# Patient Record
Sex: FEMALE | Race: WHITE | Employment: UNEMPLOYED | URBAN - METROPOLITAN AREA
[De-identification: names, ages, dates, MRNs, and addresses within clinical notes are randomized per-mention and may not be internally consistent; named-entity substitution may affect disease eponyms.]

---

## 2015-12-04 LAB — ANTIBODY: NORMAL

## 2018-04-30 ENCOUNTER — OFFICE VISIT (OUTPATIENT)
Dept: ORTHOPEDIC SURGERY | Age: 53
End: 2018-04-30

## 2018-04-30 DIAGNOSIS — M25.511 RIGHT SHOULDER PAIN, UNSPECIFIED CHRONICITY: Primary | ICD-10-CM

## 2018-04-30 DIAGNOSIS — M75.81 TENDINITIS OF RIGHT ROTATOR CUFF: ICD-10-CM

## 2018-04-30 PROCEDURE — 99203 OFFICE O/P NEW LOW 30 MIN: CPT | Performed by: PHYSICIAN ASSISTANT

## 2018-04-30 RX ORDER — METHYLPREDNISOLONE 4 MG/1
TABLET ORAL
Qty: 1 KIT | Refills: 0 | Status: SHIPPED | OUTPATIENT
Start: 2018-04-30 | End: 2018-05-06

## 2018-04-30 RX ORDER — METHYLPREDNISOLONE 4 MG/1
TABLET ORAL
Qty: 1 KIT | Refills: 0 | Status: SHIPPED | OUTPATIENT
Start: 2018-04-30 | End: 2018-04-30 | Stop reason: CLARIF

## 2018-05-08 ENCOUNTER — HOSPITAL ENCOUNTER (OUTPATIENT)
Dept: PHYSICAL THERAPY | Age: 53
Discharge: HOME | End: 2018-05-31
Admitting: PHYSICIAN ASSISTANT

## 2018-05-17 ENCOUNTER — HOSPITAL ENCOUNTER (OUTPATIENT)
Dept: PHYSICAL THERAPY | Facility: MEDICAL CENTER | Age: 53
Discharge: HOME | End: 2018-05-18
Admitting: PHYSICIAN ASSISTANT

## 2018-05-17 NOTE — FLOWSHEET NOTE
passive flexion      Self passive external rotation      Table slides            Pendulums       Wall walk      Wall ladder      Upper extremity ranger      pulleys      finisher            Self wrist overpressure      Self elbow motion and overpressure      Gripping            ER isometrics      Flexion isometrics      IR isometrics            PRE's      Supine      Supine short lever flexion      Supine chest press      Supine serratus punch 1# x30  Added 5/17   Sidelying flexion      sidelying ER 1#  x30     Prone extension 1#  x30     Prone row 1#  x30     Prone HAB 1#  x30     Prone Flexion      Prone Scaption      Standing shrugs      Wall angels (to 90 deg abd) x30  Added 5/17   Theraband      Scapular retraction- green  x30     shrugs      ER- green  x20  Pain at end   Extension- green  x30     IR- green  x30     No money- green x30  Added 5/17; pain noted   D2 flexion Add NV       - HEP provided and a copy can be found in the media file. Therapeutic Exercise and NMR EXR  [x] (87859) Provided verbal/tactile cueing for activities related to strengthening, flexibility, endurance, ROM  for improvements in scapular, scapulothoracic and UE control with self care, reaching, carrying, lifting, house/yardwork, driving/computer work.    [] (49251) Provided verbal/tactile cueing for activities related to improving balance, coordination, kinesthetic sense, posture, motor skill, proprioception  to assist with  scapular, scapulothoracic and UE control with self care, reaching, carrying, lifting, house/yardwork, driving/computer work. Therapeutic Activities:    [] (79236 or 36978) Provided verbal/tactile cueing for activities related to improving balance, coordination, kinesthetic sense, posture, motor skill, proprioception and motor activation to allow for proper function of scapular, scapulothoracic and UE control with self care, carrying, lifting, driving/computer work.      Home Exercise Program:    [x] (93029) active ROM to full, symmetrical and painfree to allow for proper joint functioning as indicated by patients Functional Deficits. - Patient will demonstrate an increase in Strength to symmetrical and painfree to allow for proper functional mobility as indicated by patient's limitations in activity. - Patient will return to desired, higher level upper extremity functional activities without increased symptoms or restriction. Progression Towards Functional goals:  [] Patient is progressing as expected towards functional goals listed. [] Progression is slowed due to complexities listed. [] Progression has been slowed due to co-morbidities.   [x] Plan just implemented, too soon to assess goals progression  [] Other:     ASSESSMENT:  See eval    Treatment/Activity Tolerance:  [x] Patient tolerated treatment well [] Patient limited by fatique  [x] Patient limited by pain  [] Patient limited by other medical complications  [] Other:     Prognosis: [x] Good [] Fair  [] Poor    Patient Requires Follow-up: [x] Yes  [] No    PLAN: See eval  [] Continue per plan of care [] Alter current plan (see comments)  [x] Plan of care initiated [] Hold pending MD visit [] Discharge    Plan for Next Session:  Progress manual therapy, PROM, AAROM, AROM and PRE's as tolerated per standard of care toward independence in rotator cuff and scapular conditioning program.    Electronically signed by: Doug Millan PT, DPT, ATC

## 2018-06-01 ENCOUNTER — HOSPITAL ENCOUNTER (OUTPATIENT)
Dept: PHYSICAL THERAPY | Age: 53
Discharge: HOME | End: 2018-06-30
Attending: PHYSICIAN ASSISTANT | Admitting: PHYSICIAN ASSISTANT

## 2018-06-07 ENCOUNTER — HOSPITAL ENCOUNTER (OUTPATIENT)
Dept: PHYSICAL THERAPY | Facility: MEDICAL CENTER | Age: 53
Discharge: HOME | End: 2018-06-08
Admitting: PHYSICIAN ASSISTANT

## 2018-07-01 ENCOUNTER — HOSPITAL ENCOUNTER (OUTPATIENT)
Dept: PHYSICAL THERAPY | Age: 53
Discharge: HOME | End: 2018-07-01
Attending: PHYSICIAN ASSISTANT | Admitting: PHYSICIAN ASSISTANT

## 2018-07-11 ENCOUNTER — OFFICE VISIT (OUTPATIENT)
Dept: INTERNAL MEDICINE CLINIC | Age: 53
End: 2018-07-11

## 2018-07-11 VITALS
SYSTOLIC BLOOD PRESSURE: 108 MMHG | HEIGHT: 64 IN | OXYGEN SATURATION: 98 % | BODY MASS INDEX: 21.17 KG/M2 | WEIGHT: 124 LBS | HEART RATE: 78 BPM | DIASTOLIC BLOOD PRESSURE: 64 MMHG

## 2018-07-11 DIAGNOSIS — Z13.1 SCREENING FOR DIABETES MELLITUS: ICD-10-CM

## 2018-07-11 DIAGNOSIS — Z13.220 LIPID SCREENING: ICD-10-CM

## 2018-07-11 DIAGNOSIS — Z00.00 ANNUAL PHYSICAL EXAM: Primary | ICD-10-CM

## 2018-07-11 LAB — Lab: NORMAL

## 2018-07-11 PROCEDURE — 99386 PREV VISIT NEW AGE 40-64: CPT | Performed by: INTERNAL MEDICINE

## 2018-07-11 ASSESSMENT — ENCOUNTER SYMPTOMS: HEARTBURN: 1

## 2019-05-16 ENCOUNTER — TELEPHONE (OUTPATIENT)
Dept: INTERNAL MEDICINE CLINIC | Age: 54
End: 2019-05-16

## 2019-05-17 ENCOUNTER — TELEPHONE (OUTPATIENT)
Dept: INTERNAL MEDICINE CLINIC | Age: 54
End: 2019-05-17

## 2019-05-20 ENCOUNTER — TELEPHONE (OUTPATIENT)
Dept: INTERNAL MEDICINE CLINIC | Age: 54
End: 2019-05-20

## 2019-05-20 NOTE — TELEPHONE ENCOUNTER
Last appointment: 7/11/2018  Next appointment: 7/19/2019    Please advise I do not see on current or HX med list. Pt states that Dr. Eriberto Jack prescribed years ago. States her allergies are very bad this year and nothing is helping.

## 2019-05-21 NOTE — TELEPHONE ENCOUNTER
What are her allergy symptoms? She's not using flonase or other similar medication, which is typically next step after antihistamine pill. Has she every tried.  Trying to determine which likely to work best.

## 2019-05-21 NOTE — TELEPHONE ENCOUNTER
Pt is having a lot of sneezing and stuffy nose. Slight cough. Pt states she tried flonase in the past and it did work . Per Dr. Pamela Dorantes. Start flonase along with zyrtec and if sxs still not improved call the office bk .

## 2019-10-11 ENCOUNTER — TELEPHONE (OUTPATIENT)
Dept: INTERNAL MEDICINE CLINIC | Age: 54
End: 2019-10-11

## 2019-10-11 ENCOUNTER — OFFICE VISIT (OUTPATIENT)
Dept: INTERNAL MEDICINE CLINIC | Age: 54
End: 2019-10-11
Payer: COMMERCIAL

## 2019-10-11 VITALS
OXYGEN SATURATION: 98 % | WEIGHT: 121 LBS | BODY MASS INDEX: 20.66 KG/M2 | HEIGHT: 64 IN | SYSTOLIC BLOOD PRESSURE: 98 MMHG | HEART RATE: 52 BPM | DIASTOLIC BLOOD PRESSURE: 54 MMHG

## 2019-10-11 DIAGNOSIS — Z13.1 SCREENING FOR DIABETES MELLITUS: ICD-10-CM

## 2019-10-11 DIAGNOSIS — Z00.00 ANNUAL PHYSICAL EXAM: Primary | ICD-10-CM

## 2019-10-11 PROCEDURE — 99396 PREV VISIT EST AGE 40-64: CPT | Performed by: INTERNAL MEDICINE

## 2019-10-11 PROCEDURE — 90471 IMMUNIZATION ADMIN: CPT | Performed by: INTERNAL MEDICINE

## 2019-10-11 PROCEDURE — 90715 TDAP VACCINE 7 YRS/> IM: CPT | Performed by: INTERNAL MEDICINE

## 2019-10-11 SDOH — HEALTH STABILITY: MENTAL HEALTH: HOW MANY STANDARD DRINKS CONTAINING ALCOHOL DO YOU HAVE ON A TYPICAL DAY?: 1 OR 2

## 2019-10-11 ASSESSMENT — PATIENT HEALTH QUESTIONNAIRE - PHQ9
1. LITTLE INTEREST OR PLEASURE IN DOING THINGS: 0
SUM OF ALL RESPONSES TO PHQ9 QUESTIONS 1 & 2: 0
2. FEELING DOWN, DEPRESSED OR HOPELESS: 0
SUM OF ALL RESPONSES TO PHQ QUESTIONS 1-9: 0
SUM OF ALL RESPONSES TO PHQ QUESTIONS 1-9: 0

## 2019-11-20 ENCOUNTER — OFFICE VISIT (OUTPATIENT)
Dept: INTERNAL MEDICINE CLINIC | Age: 54
End: 2019-11-20
Payer: COMMERCIAL

## 2019-11-20 VITALS
WEIGHT: 122 LBS | SYSTOLIC BLOOD PRESSURE: 112 MMHG | HEART RATE: 76 BPM | DIASTOLIC BLOOD PRESSURE: 58 MMHG | OXYGEN SATURATION: 98 % | BODY MASS INDEX: 20.78 KG/M2

## 2019-11-20 DIAGNOSIS — J06.9 URI WITH COUGH AND CONGESTION: Primary | ICD-10-CM

## 2019-11-20 PROCEDURE — 99213 OFFICE O/P EST LOW 20 MIN: CPT | Performed by: INTERNAL MEDICINE

## 2019-11-20 RX ORDER — BENZONATATE 100 MG/1
CAPSULE ORAL
Qty: 30 CAPSULE | Refills: 0 | Status: SHIPPED | OUTPATIENT
Start: 2019-11-20 | End: 2019-11-27

## 2019-11-20 RX ORDER — DOXYCYCLINE HYCLATE 100 MG
100 TABLET ORAL 2 TIMES DAILY
Qty: 14 TABLET | Refills: 0 | Status: SHIPPED | OUTPATIENT
Start: 2019-11-20 | End: 2019-11-27

## 2019-11-27 ENCOUNTER — TELEPHONE (OUTPATIENT)
Dept: INTERNAL MEDICINE CLINIC | Age: 54
End: 2019-11-27

## 2020-11-17 ENCOUNTER — OFFICE VISIT (OUTPATIENT)
Dept: INTERNAL MEDICINE CLINIC | Age: 55
End: 2020-11-17
Payer: COMMERCIAL

## 2020-11-17 VITALS
RESPIRATION RATE: 16 BRPM | SYSTOLIC BLOOD PRESSURE: 100 MMHG | TEMPERATURE: 97.3 F | HEIGHT: 64 IN | BODY MASS INDEX: 21 KG/M2 | OXYGEN SATURATION: 98 % | WEIGHT: 123 LBS | HEART RATE: 56 BPM | DIASTOLIC BLOOD PRESSURE: 62 MMHG

## 2020-11-17 PROBLEM — M75.81 TENDINITIS OF RIGHT ROTATOR CUFF: Status: RESOLVED | Noted: 2018-04-30 | Resolved: 2020-11-17

## 2020-11-17 PROCEDURE — 90471 IMMUNIZATION ADMIN: CPT | Performed by: INTERNAL MEDICINE

## 2020-11-17 PROCEDURE — 90686 IIV4 VACC NO PRSV 0.5 ML IM: CPT | Performed by: INTERNAL MEDICINE

## 2020-11-17 PROCEDURE — 99396 PREV VISIT EST AGE 40-64: CPT | Performed by: INTERNAL MEDICINE

## 2020-11-17 RX ORDER — CONJUGATED ESTROGENS 0.62 MG/G
CREAM VAGINAL
COMMUNITY
Start: 2020-09-01 | End: 2022-09-21

## 2020-11-17 SDOH — HEALTH STABILITY: MENTAL HEALTH: HOW OFTEN DO YOU HAVE A DRINK CONTAINING ALCOHOL?: 4 OR MORE TIMES A WEEK

## 2020-11-17 ASSESSMENT — PATIENT HEALTH QUESTIONNAIRE - PHQ9
SUM OF ALL RESPONSES TO PHQ QUESTIONS 1-9: 0
SUM OF ALL RESPONSES TO PHQ QUESTIONS 1-9: 0
1. LITTLE INTEREST OR PLEASURE IN DOING THINGS: 0
2. FEELING DOWN, DEPRESSED OR HOPELESS: 0
SUM OF ALL RESPONSES TO PHQ QUESTIONS 1-9: 0
SUM OF ALL RESPONSES TO PHQ9 QUESTIONS 1 & 2: 0

## 2020-11-17 ASSESSMENT — ENCOUNTER SYMPTOMS
RESPIRATORY NEGATIVE: 1
GASTROINTESTINAL NEGATIVE: 1

## 2020-11-17 NOTE — PATIENT INSTRUCTIONS
Recommendations for optimal health:  Be sure you are exercising at least 30 minutes  or 10,000 steps daily. Ideally you should try to get a mix of cardio and strength exercises. Work on [employer] For more detailed information, visit Nutrition Source web site- knowledge for healthy eating from [address] [place] [address]. [facility]      If your are using supplements, look for \"USP verified\" on the label. Helps to assure they are good quality. Vitamin D 800 units daily. Calcium intake - try for 800-1200 mg of calcium in combination of diet and supplements. You can read on this in much more detail on nutritionJielan Information Companye. org    8 Nutrition Tips for Eating Right:  1. Choose good carbs, not no carbs. Whole grains are your best bet. 2. Pay attention to the protein package. Fish, poultry, nuts, and beans are the best choices. 3. Choose foods with healthy fats, limit foods high in saturated fat, and avoid foods with trans fat. Plant oils, nuts, and fish are the healthiest sources. 4. Choose a fiber-filled diet, rich in whole grains, vegetables, and fruits. 5. Eat more vegetables and fruits. Go for color and variety--dark green, yellow, orange, and red. 6. Calcium is important. But milk isnt the only, or even best, source. 7. Water is best to quench your thirst. Skip the sugary drinks, and go easy on the milk and juice. 8. Eating less salt is good for everyones health. Choose more fresh foods and fewer processed foods. Aim for 2-3 cups of vegetables daily and 1 1/2-2 cups of fruits daily.

## 2021-03-16 ENCOUNTER — TELEPHONE (OUTPATIENT)
Dept: INTERNAL MEDICINE CLINIC | Age: 56
End: 2021-03-16

## 2021-03-17 ENCOUNTER — TELEPHONE (OUTPATIENT)
Dept: INTERNAL MEDICINE CLINIC | Age: 56
End: 2021-03-17

## 2021-03-17 NOTE — TELEPHONE ENCOUNTER
Spoke with patient and advised I will email form again. Patient gave alternative email [email or web address]  I have resent the email and scanned form into chart

## 2021-06-28 ENCOUNTER — TELEPHONE (OUTPATIENT)
Dept: INTERNAL MEDICINE CLINIC | Age: 56
End: 2021-06-28

## 2021-06-28 NOTE — TELEPHONE ENCOUNTER
Left message for patient to call. If patient calls invite her to do an E-Visit as Dr Chase Cerna has no openings.   I will forward this to Dr Chase Cerna for advice

## 2021-06-28 NOTE — TELEPHONE ENCOUNTER
If patient doesn't return call, please leave her details of message if allowed by her HIPAA, or send [employer] message. If appointment times works for her she can just come in then.

## 2021-06-28 NOTE — TELEPHONE ENCOUNTER
Patient called stating that she Noticed a rash on her upper thigh about 4 days ago. She feels like it might be poison ivy. It hasn't spread doesn't itch but painful to the touch. Lots of swelling around her pelvic area to the top of her thigh. No fever or discomfort but its not getting better she would like to be seen before the week is out if possible.

## 2021-06-29 ENCOUNTER — OFFICE VISIT (OUTPATIENT)
Dept: INTERNAL MEDICINE CLINIC | Age: 56
End: 2021-06-29
Payer: COMMERCIAL

## 2021-06-29 VITALS
WEIGHT: 124 LBS | DIASTOLIC BLOOD PRESSURE: 50 MMHG | HEART RATE: 60 BPM | BODY MASS INDEX: 21.28 KG/M2 | SYSTOLIC BLOOD PRESSURE: 84 MMHG

## 2021-06-29 DIAGNOSIS — Z13.1 SCREENING FOR DIABETES MELLITUS: ICD-10-CM

## 2021-06-29 DIAGNOSIS — B09 VIRAL RASH: Primary | ICD-10-CM

## 2021-06-29 DIAGNOSIS — Z13.220 LIPID SCREENING: ICD-10-CM

## 2021-06-29 PROCEDURE — 99213 OFFICE O/P EST LOW 20 MIN: CPT | Performed by: INTERNAL MEDICINE

## 2021-06-29 RX ORDER — VALACYCLOVIR HYDROCHLORIDE 1 G/1
1000 TABLET, FILM COATED ORAL DAILY
Qty: 10 TABLET | Refills: 0 | Status: SHIPPED | OUTPATIENT
Start: 2021-06-29 | End: 2022-09-21

## 2021-06-29 ASSESSMENT — PATIENT HEALTH QUESTIONNAIRE - PHQ9
SUM OF ALL RESPONSES TO PHQ9 QUESTIONS 1 & 2: 0
1. LITTLE INTEREST OR PLEASURE IN DOING THINGS: 0
SUM OF ALL RESPONSES TO PHQ QUESTIONS 1-9: 0
2. FEELING DOWN, DEPRESSED OR HOPELESS: 0

## 2021-06-29 NOTE — PROGRESS NOTES
contain inaccuracies due to incorrect word recognition.   If further clarification is needed please contact the office at [phone]    --Padmini Felix MD

## 2022-08-17 ENCOUNTER — TELEPHONE (OUTPATIENT)
Dept: INTERNAL MEDICINE CLINIC | Age: 57
End: 2022-08-17

## 2022-08-17 DIAGNOSIS — Z13.9 ENCOUNTER FOR HEALTH-RELATED SCREENING: Primary | ICD-10-CM

## 2022-08-17 NOTE — TELEPHONE ENCOUNTER
Patient stated that she had a friend do the scan and it caught some major heart issues and the patietn stated that because her dad has had 2 heart attacks she feels as though she Is higher risk and would like to have it done

## 2022-08-17 NOTE — TELEPHONE ENCOUNTER
Let her know this is no problem. I just wanted to be sure she was not having chest pain. I have placed the order and we can get it faxed for her. Please fax as requested.

## 2022-08-25 ENCOUNTER — TELEPHONE (OUTPATIENT)
Dept: INTERNAL MEDICINE CLINIC | Age: 57
End: 2022-08-25

## 2022-08-25 DIAGNOSIS — Z13.1 SCREENING FOR DIABETES MELLITUS: Primary | ICD-10-CM

## 2022-08-25 DIAGNOSIS — Z13.220 LIPID SCREENING: ICD-10-CM

## 2022-08-25 NOTE — TELEPHONE ENCOUNTER
----- Message from [employer] sent at 8/25/2022  2:41 PM EDT -----  Subject: Referral Request    Reason for referral request? Patient is requesting orders for blood work   before her appt 9/21  Provider patient wants to be referred to(if known):     Provider Phone Number(if known):     Additional Information for Provider?   ---------------------------------------------------------------------------  --------------  [id number] [facility]    [phone]; [place] to leave message on voicemail  ---------------------------------------------------------------------------  --------------

## 2022-09-21 ENCOUNTER — OFFICE VISIT (OUTPATIENT)
Dept: INTERNAL MEDICINE CLINIC | Age: 57
End: 2022-09-21
Payer: COMMERCIAL

## 2022-09-21 VITALS
SYSTOLIC BLOOD PRESSURE: 114 MMHG | DIASTOLIC BLOOD PRESSURE: 66 MMHG | BODY MASS INDEX: 20.73 KG/M2 | OXYGEN SATURATION: 98 % | TEMPERATURE: 98.1 F | HEART RATE: 67 BPM | HEIGHT: 64 IN | WEIGHT: 121.4 LBS

## 2022-09-21 DIAGNOSIS — Z00.00 WELLNESS EXAMINATION: Primary | ICD-10-CM

## 2022-09-21 PROCEDURE — 99396 PREV VISIT EST AGE 40-64: CPT | Performed by: INTERNAL MEDICINE

## 2022-09-21 PROCEDURE — 90471 IMMUNIZATION ADMIN: CPT | Performed by: INTERNAL MEDICINE

## 2022-09-21 PROCEDURE — 90674 CCIIV4 VAC NO PRSV 0.5 ML IM: CPT | Performed by: INTERNAL MEDICINE

## 2022-09-21 SDOH — ECONOMIC STABILITY: FOOD INSECURITY: WITHIN THE PAST 12 MONTHS, YOU WORRIED THAT YOUR FOOD WOULD RUN OUT BEFORE YOU GOT MONEY TO BUY MORE.: NEVER TRUE

## 2022-09-21 SDOH — ECONOMIC STABILITY: FOOD INSECURITY: WITHIN THE PAST 12 MONTHS, THE FOOD YOU BOUGHT JUST DIDN'T LAST AND YOU DIDN'T HAVE MONEY TO GET MORE.: NEVER TRUE

## 2022-09-21 ASSESSMENT — ENCOUNTER SYMPTOMS
RESPIRATORY NEGATIVE: 1
GASTROINTESTINAL NEGATIVE: 1

## 2022-09-21 ASSESSMENT — PATIENT HEALTH QUESTIONNAIRE - PHQ9
SUM OF ALL RESPONSES TO PHQ QUESTIONS 1-9: 0
SUM OF ALL RESPONSES TO PHQ QUESTIONS 1-9: 0
1. LITTLE INTEREST OR PLEASURE IN DOING THINGS: 0
2. FEELING DOWN, DEPRESSED OR HOPELESS: 0
SUM OF ALL RESPONSES TO PHQ9 QUESTIONS 1 & 2: 0
SUM OF ALL RESPONSES TO PHQ QUESTIONS 1-9: 0
SUM OF ALL RESPONSES TO PHQ QUESTIONS 1-9: 0

## 2022-09-21 ASSESSMENT — SOCIAL DETERMINANTS OF HEALTH (SDOH): HOW HARD IS IT FOR YOU TO PAY FOR THE VERY BASICS LIKE FOOD, HOUSING, MEDICAL CARE, AND HEATING?: NOT HARD AT ALL

## 2022-09-21 NOTE — PATIENT INSTRUCTIONS
Recommendations for optimal health:  Be sure you are exercising at least 30 minutes  or 10,000 steps daily. Ideally you should try to get a mix of cardio and strength exercises. Work on [employer] For more detailed information, visit Nutrition Source web site- knowledge for healthy eating from [address] [place] [address]. [facility]      If your are using supplements, look for \"USP verified\" on the label. Helps to assure they are good quality. Vitamin D 800 units daily. Calcium intake - try for 800-1200 mg of calcium in combination of diet and supplements. You can read on this in much more detail on [employer]. org    8 Nutrition Tips for Eating Right:  1. Choose good carbs, not no carbs. Whole grains are your best bet. 2. Pay attention to the protein package. Fish, poultry, nuts, and beans are the best choices. 3. Choose foods with healthy fats, limit foods high in saturated fat, and avoid foods with trans fat. Plant oils, nuts, and fish are the healthiest sources. 4. Choose a fiber-filled diet, rich in whole grains, vegetables, and fruits. 5. Eat more vegetables and fruits. Go for color and variety--dark green, yellow, orange, and red. 6. Calcium is important. But milk isnt the only, or even best, source. 7. Water is best to quench your thirst. Skip the sugary drinks, and go easy on the milk and juice. 8. Eating less salt is good for everyones health. Choose more fresh foods and fewer processed foods. Aim for 2-3 cups of vegetables daily and 1 1/2-2 cups of fruits daily.

## 2022-09-21 NOTE — PROGRESS NOTES
Annual Physical Exam      Marchia Foot  : 1965    Date of Service: 2022    Chief Complaint: 62 y.o. female here for  Annual Exam (Discuss flu shot, abstracting pap smear/)    HPI:  [place] [employer] [place] winter. Cardiac calcium score at [facility].    Voltaren for feet and hands. Melatonin didn't help stay asleep. Stopped using vaginal estrogen cream, wasn't consistent. Still with painful intercourse. No hot flashes. Exercise: Exercises daily. Variety. Yoga 3x/week. Bike. Diet: normal/healthy. Sleep:     No LMP recorded.      Patient Care Team:  Rajat Grigsby MD as PCP - General (Internal Medicine)  Rajat Grigsby MD as PCP - [facility] Empaneled Provider  Shawna Olmos MD as Consulting Physician (Dermatology)  Jeison Castellon (Obstetrics & Gynecology)    Health Maintenance   Topic Date Due    Shingles vaccine (1 of 2) Never done    COVID-19 Vaccine (4 - Booster for Pfizer series) 2022    Flu vaccine (1) 2022    Depression Screen  2023    Breast cancer screen  2023    Colorectal Cancer Screen  2026    Cervical cancer screen  2026    Lipids  2027    DTaP/Tdap/Td vaccine (3 - Td or Tdap) 10/11/2029    Hepatitis C screen  Completed    Hepatitis A vaccine  Aged Out    Hepatitis B vaccine  Aged Out    Hib vaccine  Aged Out    Meningococcal (ACWY) vaccine  Aged Out    Pneumococcal 0-64 years Vaccine  Aged Out    HIV screen  Discontinued       Immunization History   Administered Date(s) Administered    COVID-19, PFIZER PURPLE top, DILUTE for use, (age 15 y+), 30mcg/0.3mL 2021, 2021, 2021    Hepatitis A Adult (Havrix, Vaqta) 11/15/2019    Influenza Virus Vaccine 10/09/2019    Influenza, FLUARIX, FLULAVAL, FLUZONE (age 10 mo+) AND AFLURIA, (age 1 y+), PF, 0.5mL 2020    Tdap (Boostrix, Adacel) 10/14/2009, 10/11/2019       No Known Allergies    Outpatient Medications Marked as Taking for the 22 encounter (Office Visit) with Stephen Carrington MD   Medication Sig Dispense Refill    doxyLAMINE succinate (GNP SLEEP AID) 25 MG tablet Take 12.5 mg by mouth nightly      valACYclovir (VALTREX) 1 g tablet Take 1 tablet by mouth daily for 5 days At first sign of outbreak. 10 tablet 0    MULTIPLE VITAMIN PO Take by mouth         Past Medical History:   Diagnosis Date    Basal cell carcinoma of skin 05/01/2013    Sees Dr. Reyna Marie, face and arm    HSV-2 infection 10/14/2015    Overview:  One outbreak per year. Menstrual migraine 10/14/2015    Overview:  No aura    Seasonal allergies 5/1/2013    Tendinitis of right rotator cuff 4/30/2018     Past Surgical History:   Procedure Laterality Date    MOHS SURGERY       Family History   Problem Relation Age of Onset    Hypertension Mother     Coronary Art Dis Father         MI at 72    No Known Problems Sister     No Known Problems Daughter     No Known Problems Daughter      Social History     Tobacco Use    Smoking status: Never    Smokeless tobacco: Never   Substance Use Topics    Alcohol use: Yes     Alcohol/week: 5.0 standard drinks     Types: 5 Glasses of wine per week    Drug use: No       Review of Systems   Constitutional: Negative. Respiratory: Negative. Cardiovascular: Negative. Gastrointestinal: Negative. Endocrine: Negative. Neurological: Negative. Wt Readings from Last 5 Encounters:   09/21/22 121 lb 6.4 oz (55.1 kg)   06/29/21 124 lb (56.2 kg)   11/17/20 123 lb (55.8 kg)   11/20/19 122 lb (55.3 kg)   10/11/19 121 lb (54.9 kg)     BP Readings from Last 3 Encounters:   09/21/22 114/66   06/29/21 (!) 84/50   11/17/20 100/62       Physical Exam:   Vitals:    09/21/22 1436   BP: 114/66   Site: Right Upper Arm   Position: Sitting   Cuff Size: Medium Adult   Pulse: 67   Temp: 98.1 °F (36.7 °C)   TempSrc: Temporal   SpO2: 98%   Weight: 121 lb 6.4 oz (55.1 kg)   Height: 5' 4\" (1.626 m)     Body mass index is 20.84 kg/m².    Physical Exam  Constitutional:       Appearance: Normal appearance. She is well-developed. HENT:      Right Ear: Tympanic membrane, ear canal and external ear normal.      Left Ear: Tympanic membrane, ear canal and external ear normal.   Neck:      Thyroid: No thyromegaly. Vascular: No carotid bruit. Cardiovascular:      Rate and Rhythm: Normal rate and regular rhythm. Pulmonary:      Effort: Pulmonary effort is normal.      Breath sounds: Normal breath sounds. Abdominal:      General: Bowel sounds are normal.      Palpations: Abdomen is soft. There is no mass. Tenderness: There is no abdominal tenderness. Lymphadenopathy:      Cervical: No cervical adenopathy. Skin:     General: Skin is warm and dry. Coloration: Skin is not pale. Comments: Sun damage and numerous solar lentigo   Neurological:      General: No focal deficit present. Mental Status: She is alert. Psychiatric:         Mood and Affect: Mood normal.     The 10-year ASCVD risk score (Aundra Denver., et al., 2013) is: 1.3%    Values used to calculate the score:      Age: 62 years      Sex: Female      Is Non- : No      Diabetic: No      Tobacco smoker: No      Systolic Blood Pressure: 072 mmHg      Is BP treated: No      HDL Cholesterol: 81 mg/dL      Total Cholesterol: 186 mg/dL    Glucose:  Glucose (mg/dL)   Date Value   11/20/2019 81     Lipids  Lab Results   Component Value Date    HDL 81 (H) 09/16/2022    LDLCALC 97 09/16/2022       ASSESSMENT/PLAN:  Annual PE/Wellness exam  Discussed age appropriate preventive care including healthy diet, daily exercise, immunizations and age & gender guided screening tests. Flu shot today. Reminded to get shingrix series and to get updated covid booster. Return in about 1 year (around 9/21/2023). Chaka Allan MD    This note was generated completely or in part utilizing Dragon dictation speech recognition software.   Occasionally, words are mistranscribed and despite editing, the text may contain inaccuracies due to incorrect word recognition.   If further clarification is needed please contact the office at [phone]

## 2024-08-05 ENCOUNTER — TELEPHONE (OUTPATIENT)
Dept: SURGERY | Age: 59
End: 2024-08-05

## 2024-08-05 NOTE — TELEPHONE ENCOUNTER
Called patient @ [phone] [facility] regarding your appointment scheduled with Dr. Candace Rai on Monday, Oct. 21, 2024 due to a schedule change your appointment is at 7:45 am with an arrival at 7:30 am arrival. Please return our call at [phone] to confirm appointment change.